# Patient Record
Sex: MALE | Race: WHITE | NOT HISPANIC OR LATINO | Employment: FULL TIME | URBAN - METROPOLITAN AREA
[De-identification: names, ages, dates, MRNs, and addresses within clinical notes are randomized per-mention and may not be internally consistent; named-entity substitution may affect disease eponyms.]

---

## 2023-08-25 ENCOUNTER — TELEPHONE (OUTPATIENT)
Dept: UROLOGY | Facility: CLINIC | Age: 53
End: 2023-08-25
Payer: COMMERCIAL

## 2023-09-05 DIAGNOSIS — R97.20 ELEVATED PSA: Primary | ICD-10-CM

## 2023-09-06 ENCOUNTER — OFFICE VISIT (OUTPATIENT)
Dept: UROLOGY | Facility: CLINIC | Age: 53
End: 2023-09-06
Payer: COMMERCIAL

## 2023-09-06 VITALS — DIASTOLIC BLOOD PRESSURE: 91 MMHG | SYSTOLIC BLOOD PRESSURE: 151 MMHG | HEART RATE: 94 BPM

## 2023-09-06 DIAGNOSIS — R97.20 ELEVATED PSA: Primary | ICD-10-CM

## 2023-09-06 DIAGNOSIS — Z80.42 FAMILY HISTORY OF PROSTATE CANCER IN FATHER: ICD-10-CM

## 2023-09-06 DIAGNOSIS — R39.198 DIFFICULTY VOIDING: ICD-10-CM

## 2023-09-06 LAB
BILIRUBIN, UA POC OHS: NEGATIVE
BLOOD, UA POC OHS: NEGATIVE
CLARITY, UA POC OHS: CLEAR
COLOR, UA POC OHS: YELLOW
GLUCOSE, UA POC OHS: NEGATIVE
KETONES, UA POC OHS: NEGATIVE
LEUKOCYTES, UA POC OHS: NEGATIVE
NITRITE, UA POC OHS: NEGATIVE
PH, UA POC OHS: 5
PROTEIN, UA POC OHS: NEGATIVE
SPECIFIC GRAVITY, UA POC OHS: 1.02
UROBILINOGEN, UA POC OHS: 0.2

## 2023-09-06 PROCEDURE — 1159F PR MEDICATION LIST DOCUMENTED IN MEDICAL RECORD: ICD-10-PCS | Mod: CPTII,S$GLB,, | Performed by: NURSE PRACTITIONER

## 2023-09-06 PROCEDURE — 4010F PR ACE/ARB THEARPY RXD/TAKEN: ICD-10-PCS | Mod: CPTII,S$GLB,, | Performed by: NURSE PRACTITIONER

## 2023-09-06 PROCEDURE — 99204 PR OFFICE/OUTPT VISIT, NEW, LEVL IV, 45-59 MIN: ICD-10-PCS | Mod: S$GLB,,, | Performed by: NURSE PRACTITIONER

## 2023-09-06 PROCEDURE — 3077F PR MOST RECENT SYSTOLIC BLOOD PRESSURE >= 140 MM HG: ICD-10-PCS | Mod: CPTII,S$GLB,, | Performed by: NURSE PRACTITIONER

## 2023-09-06 PROCEDURE — 1159F MED LIST DOCD IN RCRD: CPT | Mod: CPTII,S$GLB,, | Performed by: NURSE PRACTITIONER

## 2023-09-06 PROCEDURE — 3080F DIAST BP >= 90 MM HG: CPT | Mod: CPTII,S$GLB,, | Performed by: NURSE PRACTITIONER

## 2023-09-06 PROCEDURE — 3077F SYST BP >= 140 MM HG: CPT | Mod: CPTII,S$GLB,, | Performed by: NURSE PRACTITIONER

## 2023-09-06 PROCEDURE — 1160F RVW MEDS BY RX/DR IN RCRD: CPT | Mod: CPTII,S$GLB,, | Performed by: NURSE PRACTITIONER

## 2023-09-06 PROCEDURE — 81003 POCT URINALYSIS(INSTRUMENT): ICD-10-PCS | Mod: QW,S$GLB,, | Performed by: NURSE PRACTITIONER

## 2023-09-06 PROCEDURE — 4010F ACE/ARB THERAPY RXD/TAKEN: CPT | Mod: CPTII,S$GLB,, | Performed by: NURSE PRACTITIONER

## 2023-09-06 PROCEDURE — 3080F PR MOST RECENT DIASTOLIC BLOOD PRESSURE >= 90 MM HG: ICD-10-PCS | Mod: CPTII,S$GLB,, | Performed by: NURSE PRACTITIONER

## 2023-09-06 PROCEDURE — 99204 OFFICE O/P NEW MOD 45 MIN: CPT | Mod: S$GLB,,, | Performed by: NURSE PRACTITIONER

## 2023-09-06 PROCEDURE — 1160F PR REVIEW ALL MEDS BY PRESCRIBER/CLIN PHARMACIST DOCUMENTED: ICD-10-PCS | Mod: CPTII,S$GLB,, | Performed by: NURSE PRACTITIONER

## 2023-09-06 PROCEDURE — 81003 URINALYSIS AUTO W/O SCOPE: CPT | Mod: QW,S$GLB,, | Performed by: NURSE PRACTITIONER

## 2023-09-06 RX ORDER — TAMSULOSIN HYDROCHLORIDE 0.4 MG/1
0.4 CAPSULE ORAL DAILY
Qty: 30 CAPSULE | Refills: 11 | Status: SHIPPED | OUTPATIENT
Start: 2023-09-06 | End: 2024-09-05

## 2023-09-06 NOTE — PROGRESS NOTES
Subjective:       Patient ID: Pedro Contreras is a 52 y.o. male.    Chief Complaint: Elevated PSA      HPI: 52-year-old male, new to [facility], referred for an elevated PSA.    Patient had a PSA on 08/08/2023 of 6.64 with a% free of 5.  Patient states 4 weeks prior to that his PSA was 6.66.    Patient's have a family history of prostate cancer.  States his father had prostate cancer.    Patient complains of some difficulty voiding.  Feels like he has to strain to void.  Also states his stream is slow and weak.    Denies any pain or burning urination.  Denies any odor to the urine.  Denies any fever or body aches.  Denies any unexpected weight loss.  No other urinary complaints at this time.       Past Medical History:   Past Medical History:   Diagnosis Date    Essential (primary) hypertension     Gout, unspecified     High cholesterol     Kidney stones     Type 2 diabetes mellitus without complications        Past Surgical Historical:   Past Surgical History:   Procedure Laterality Date    APPENDECTOMY      KIDNEY STONE SURGERY      LITHOTRIPSY      URETEROSCOPY          Medications:   Medication List with Changes/Refills   New Medications    TAMSULOSIN (FLOMAX) 0.4 MG CAP    Take 1 capsule (0.4 mg total) by mouth once daily.        Past Social History:   Social History     Socioeconomic History    Marital status:    Tobacco Use    Smoking status: Never    Smokeless tobacco: Never   Substance and Sexual Activity    Alcohol use: Yes    Drug use: Never       Allergies:   Review of patient's allergies indicates:   Allergen Reactions    Cipro [ciprofloxacin hcl]         Family History: History reviewed. No pertinent family history.     Review of Systems:  Review of Systems   Constitutional:  Negative for activity change and appetite change.   HENT:  Negative for congestion and dental problem.    Eyes:  Negative for visual disturbance.   Respiratory:  Negative for chest tightness and shortness of  breath.    Cardiovascular:  Negative for chest pain.   Gastrointestinal:  Negative for abdominal distention and abdominal pain.   Genitourinary:  Positive for difficulty urinating. Negative for decreased urine volume, dysuria, enuresis, flank pain, frequency, genital sores, hematuria, penile discharge, penile pain, penile swelling, scrotal swelling, testicular pain and urgency.   Musculoskeletal:  Negative for back pain and neck pain.   Skin:  Negative for color change.   Neurological:  Negative for dizziness.   Hematological:  Negative for adenopathy.   Psychiatric/Behavioral:  Negative for agitation, behavioral problems and confusion.        Physical Exam:  Physical Exam  Vitals and nursing note reviewed.   Constitutional:       Appearance: He is well-developed.   HENT:      Head: Normocephalic.   Eyes:      Pupils: Pupils are equal, round, and reactive to light.   Cardiovascular:      Rate and Rhythm: Normal rate and regular rhythm.      Heart sounds: Normal heart sounds.   Pulmonary:      Effort: Pulmonary effort is normal.      Breath sounds: Normal breath sounds.   Abdominal:      General: Bowel sounds are normal.      Palpations: Abdomen is soft.   Genitourinary:     Penis: Normal.       Prostate: Enlarged (Prostate difficult to palpate due to large pannus.  Prostate is enlarged with no nodules and nontender.  Prostate is symmetrical.).      Rectum: Normal.   Musculoskeletal:         General: Normal range of motion.      Cervical back: Normal range of motion and neck supple.   Skin:     General: Skin is warm and dry.   Neurological:      Mental Status: He is alert and oriented to person, place, and time.   Psychiatric:         Behavior: Behavior normal.     Urinalysis:  Normal   Bladder scan:  0 cc    Assessment/Plan:   1. Elevated PSA/family history of prostate cancer:  Patient's PSA is elevated at 6.64.  His% free was 5%  Patient had a colonoscopy yesterday.  Therefore will not proceed with repeat  PSA.  Discussed MRI and biopsy.    At this time patient would like to proceed with MRI.  Did discuss the recommendation of a prostate biopsy regardless of MRI results.    Patient did state understanding.  However, will wait for MRI results before proceeding with biopsy.    2. Difficulty voiding:  Patient's urinalysis is normal.  Bladder scan is 0 cc.  Will go ahead and start the patient on Flomax 0.4 mg daily.    Follow-up to arrange pending MRI.  Problem List Items Addressed This Visit    None  Visit Diagnoses       Elevated PSA    -  Primary    Relevant Orders    POCT Urinalysis(Instrument) (Completed)    MRI Prostate W W/O Contrast    Family history of prostate cancer in father        Relevant Orders    MRI Prostate W W/O Contrast    Difficulty voiding        Relevant Medications    tamsulosin (FLOMAX) 0.4 mg Cap    Other Relevant Orders    POCT Bladder Scan

## 2023-09-13 ENCOUNTER — TELEPHONE (OUTPATIENT)
Dept: UROLOGY | Facility: CLINIC | Age: 53
End: 2023-09-13
Payer: COMMERCIAL

## 2023-09-13 NOTE — TELEPHONE ENCOUNTER
Spoke with pt. Advised that sometimes some insurance take a little longer to approve but will get with other staff member to check into this.

## 2023-09-13 NOTE — TELEPHONE ENCOUNTER
----- Message from Susie Murray sent at 9/13/2023  2:34 PM CDT -----  Contact: Sherri/ Dr. Agustin Polanco needs someone from the office to call the patient at [phone], Regards to no one has called him to schedule the MRI and he needs to know the status of his referral.    Thanks  Td

## 2023-09-14 NOTE — TELEPHONE ENCOUNTER
Spoke with pt to see if he heard anything yet. He reported no. I advised that I did check with management and she was showing pending insurance approval. Pt states he will call his insurance and check into this.

## 2023-10-04 DIAGNOSIS — R97.20 ABNORMAL PROSTATE SPECIFIC ANTIGEN (PSA): Primary | ICD-10-CM

## 2023-10-04 DIAGNOSIS — N42.89 PROSTATE MASS: ICD-10-CM

## 2023-10-10 ENCOUNTER — TELEPHONE (OUTPATIENT)
Dept: UROLOGY | Facility: CLINIC | Age: 53
End: 2023-10-10
Payer: COMMERCIAL

## 2023-10-10 NOTE — TELEPHONE ENCOUNTER
Spoke with pt, he had a few questions therefore I answered them all to the best of my ability. Pt stated understanding. He will come for con/edu and do TRUS w/BX here as planned. I advised he could call anytime with questions or concerns and he could write down questions and we would address them at con/edu appointment if he did not call.

## 2023-10-10 NOTE — TELEPHONE ENCOUNTER
----- Message from Kenia Del Angel MA sent at 10/10/2023 10:01 AM CDT -----    ----- Message -----  From: Arlene Mae  Sent: 10/10/2023   9:09 AM CDT  To: Mayte Christianson Staff    Pt set up trus/bx but has ?'s about the mri again and option of having bx at [facility]

## 2023-10-23 ENCOUNTER — TELEPHONE (OUTPATIENT)
Dept: UROLOGY | Facility: CLINIC | Age: 53
End: 2023-10-23
Payer: COMMERCIAL

## 2023-10-23 NOTE — TELEPHONE ENCOUNTER
Contacted, documents faxed per request. BJP    ----- Message from Alexa Palmer sent at 10/23/2023  8:47 AM CDT -----  Za from [facility] in regards to if medical request sent on 10/12/23. Still waiting on response... please call her back at [phone] or fax [phone].            Thanks  nikolas

## 2023-11-14 RX ORDER — COLCHICINE 0.6 MG/1
1.2 TABLET ORAL
COMMUNITY
Start: 2023-09-13

## 2023-11-14 RX ORDER — PRAVASTATIN SODIUM 20 MG/1
20 TABLET ORAL
COMMUNITY
Start: 2023-08-08

## 2023-11-14 RX ORDER — OFLOXACIN 3 MG/ML
SOLUTION/ DROPS OPHTHALMIC
COMMUNITY
Start: 2023-08-21

## 2023-11-14 RX ORDER — LISINOPRIL 40 MG/1
TABLET ORAL
COMMUNITY
Start: 2023-10-18

## 2023-11-14 RX ORDER — ALLOPURINOL 300 MG/1
TABLET ORAL
COMMUNITY
Start: 2023-10-18

## 2023-11-14 RX ORDER — TIRZEPATIDE 2.5 MG/.5ML
2.5 INJECTION, SOLUTION SUBCUTANEOUS
COMMUNITY
Start: 2023-07-11

## 2023-11-14 RX ORDER — METFORMIN HYDROCHLORIDE 750 MG/1
TABLET, EXTENDED RELEASE ORAL
COMMUNITY
Start: 2023-06-27

## 2023-11-14 RX ORDER — FUROSEMIDE 40 MG/1
TABLET ORAL
COMMUNITY
Start: 2023-10-18

## 2023-11-14 RX ORDER — ALLOPURINOL 100 MG/1
TABLET ORAL
COMMUNITY
Start: 2023-09-20

## 2023-11-15 ENCOUNTER — CLINICAL SUPPORT (OUTPATIENT)
Dept: UROLOGY | Facility: CLINIC | Age: 53
End: 2023-11-15
Payer: COMMERCIAL

## 2023-11-15 DIAGNOSIS — R97.20 ELEVATED PSA: Primary | ICD-10-CM

## 2023-11-15 DIAGNOSIS — N40.2 PROSTATE NODULE: ICD-10-CM

## 2023-11-15 NOTE — PROGRESS NOTES
Consents and education completed for TRUS BX at [facility] on 11/30/23. Pt given highlighted written instructions. Pt states understanding of all verbal and written instructions. Advised to call for any questions or concerns, pt had no questions.    NOTE: Pt has cipro allergy therefore will get with providers for abx treatment prior, during, and after procedure.

## 2023-11-16 RX ORDER — CEFDINIR 300 MG/1
300 CAPSULE ORAL 2 TIMES DAILY
Qty: 8 CAPSULE | Refills: 0 | Status: SHIPPED | OUTPATIENT
Start: 2023-11-16 | End: 2023-11-20

## 2023-11-16 RX ORDER — DIAZEPAM 10 MG/1
10 TABLET ORAL ONCE
Qty: 1 TABLET | Refills: 0 | Status: SHIPPED | OUTPATIENT
Start: 2023-11-16 | End: 2023-11-16

## 2023-11-29 ENCOUNTER — TELEPHONE (OUTPATIENT)
Dept: UROLOGY | Facility: CLINIC | Age: 53
End: 2023-11-29
Payer: COMMERCIAL

## 2023-11-30 ENCOUNTER — PROCEDURE VISIT (OUTPATIENT)
Dept: UROLOGY | Facility: CLINIC | Age: 53
End: 2023-11-30
Payer: COMMERCIAL

## 2023-11-30 VITALS
OXYGEN SATURATION: 97 % | HEART RATE: 82 BPM | HEIGHT: 72 IN | WEIGHT: 311.44 LBS | SYSTOLIC BLOOD PRESSURE: 123 MMHG | DIASTOLIC BLOOD PRESSURE: 64 MMHG | RESPIRATION RATE: 20 BRPM | BODY MASS INDEX: 42.18 KG/M2

## 2023-11-30 DIAGNOSIS — R97.20 ABNORMAL PROSTATE SPECIFIC ANTIGEN (PSA): ICD-10-CM

## 2023-11-30 PROCEDURE — 55700 TRANSRECTAL ULTRASOUND W/ BIOPSY: CPT | Mod: S$GLB,,, | Performed by: UROLOGY

## 2023-11-30 PROCEDURE — 55700 TRANSRECTAL ULTRASOUND W/ BIOPSY: ICD-10-PCS | Mod: S$GLB,,, | Performed by: UROLOGY

## 2023-11-30 PROCEDURE — 76872 US TRANSRECTAL: CPT | Mod: S$GLB,,, | Performed by: UROLOGY

## 2023-11-30 PROCEDURE — 76872 TRANSRECTAL ULTRASOUND W/ BIOPSY: ICD-10-PCS | Mod: S$GLB,,, | Performed by: UROLOGY

## 2023-11-30 NOTE — PATIENT INSTRUCTIONS
NO STRENUOUS ACTIVITY FOR 3 DAYS  NO SEXUAL INTERCOURSE FOR 7 DAYS  YOU MAY NOTICE BLOOD IN URINE OR SEMEN FOR SEVERAL DAYS                What to Expect After a Prostate Biopsy    Please be sure to finish your pre-procedure antibiotics as instructed.    You may have mild bleeding from the rectum or urine for about 1 week to 1 month, or in your ejaculate for several months. This bleeding is normal and expected, and it will stop. You may have mild discomfort in your rectal or urethral area for 24-48 hours.    You cannot do any strenuous lifting, straining, or exercising for 24 hours. You may return to full activity the day after the biopsy.    You may continue to take all your regular medications after the procedure except for the blood thinners.    You may resume all blood-thinning medications once you no longer see any bleeding or whenever your physician prescribing the medication says it is all right to do so. You may take Tylenol if you have a fever and your temperature is less than 100° F or if you have some discomfort.    You will receive a call from the Urology Department at [facility] with the results of your prostate biopsy within one week.    Signs and Symptoms to Report    Call your [facility] urologist at [phone] if you develop any of the following:  Temperature greater than 101°  F  Inability to urinate  A large amount of bleeding from the rectum or in the urine  Persistent or severe pain    After hours or on weekends, you may reach a urology resident on call at this number: [phone].

## 2023-11-30 NOTE — PROCEDURES
"Transrectal Ultrasound w/ Biopsy    Date/Time: 11/30/2023 2:30 PM    Performed by: Sammy Hu MD  Authorized by: Tu Bustos NP    Consent Done?:  Yes (Written)  Time out: Immediately prior to procedure a "time out" was called to verify the correct patient, procedure, equipment, support staff and site/side marked as required.    Indications: Elevated PSA    Position:  Left lateral  Anesthesia:  Pudendal nerve block  Patient sedated: No    Prostate Size:  28g  Left Base Biopsies: 2  Left Mid Biopsies: 2  Left Beaver Biopsies: 2  Right Base Biopsies: 2  Right Mid Biopsies: 2  Right Beaver Biopsies: 2  Total Biopsies:  12    Patient tolerance:  Patient tolerated the procedure well with no immediate complications     Patient was brought to the procedure room placed on the table in left lateral decubitus position lidocaine jelly was instilled into the rectum the ultrasound probe was advanced to level of prostate and a total of 10 cc of lidocaine was injected into the bilateral alfonso prostatic fossa.  A standard 12 core prostate biopsy was obtained patient tolerated the procedure well there were no complications    "

## 2023-12-07 ENCOUNTER — TELEPHONE (OUTPATIENT)
Dept: UROLOGY | Facility: CLINIC | Age: 53
End: 2023-12-07

## 2023-12-07 ENCOUNTER — OFFICE VISIT (OUTPATIENT)
Dept: UROLOGY | Facility: CLINIC | Age: 53
End: 2023-12-07
Payer: COMMERCIAL

## 2023-12-07 VITALS
DIASTOLIC BLOOD PRESSURE: 64 MMHG | HEART RATE: 82 BPM | RESPIRATION RATE: 18 BRPM | SYSTOLIC BLOOD PRESSURE: 120 MMHG | TEMPERATURE: 99 F

## 2023-12-07 DIAGNOSIS — C61 PROSTATE CANCER: Primary | ICD-10-CM

## 2023-12-07 PROCEDURE — 3078F DIAST BP <80 MM HG: CPT | Mod: CPTII,S$GLB,, | Performed by: UROLOGY

## 2023-12-07 PROCEDURE — 3074F SYST BP LT 130 MM HG: CPT | Mod: CPTII,S$GLB,, | Performed by: UROLOGY

## 2023-12-07 PROCEDURE — 1160F PR REVIEW ALL MEDS BY PRESCRIBER/CLIN PHARMACIST DOCUMENTED: ICD-10-PCS | Mod: CPTII,S$GLB,, | Performed by: UROLOGY

## 2023-12-07 PROCEDURE — 1160F RVW MEDS BY RX/DR IN RCRD: CPT | Mod: CPTII,S$GLB,, | Performed by: UROLOGY

## 2023-12-07 PROCEDURE — 1159F MED LIST DOCD IN RCRD: CPT | Mod: CPTII,S$GLB,, | Performed by: UROLOGY

## 2023-12-07 PROCEDURE — 3078F PR MOST RECENT DIASTOLIC BLOOD PRESSURE < 80 MM HG: ICD-10-PCS | Mod: CPTII,S$GLB,, | Performed by: UROLOGY

## 2023-12-07 PROCEDURE — 4010F PR ACE/ARB THEARPY RXD/TAKEN: ICD-10-PCS | Mod: CPTII,S$GLB,, | Performed by: UROLOGY

## 2023-12-07 PROCEDURE — 3074F PR MOST RECENT SYSTOLIC BLOOD PRESSURE < 130 MM HG: ICD-10-PCS | Mod: CPTII,S$GLB,, | Performed by: UROLOGY

## 2023-12-07 PROCEDURE — 4010F ACE/ARB THERAPY RXD/TAKEN: CPT | Mod: CPTII,S$GLB,, | Performed by: UROLOGY

## 2023-12-07 PROCEDURE — 99215 PR OFFICE/OUTPT VISIT, EST, LEVL V, 40-54 MIN: ICD-10-PCS | Mod: S$GLB,,, | Performed by: UROLOGY

## 2023-12-07 PROCEDURE — 99215 OFFICE O/P EST HI 40 MIN: CPT | Mod: S$GLB,,, | Performed by: UROLOGY

## 2023-12-07 PROCEDURE — 1159F PR MEDICATION LIST DOCUMENTED IN MEDICAL RECORD: ICD-10-PCS | Mod: CPTII,S$GLB,, | Performed by: UROLOGY

## 2023-12-07 NOTE — PROGRESS NOTES
Broad-based abutment of the prostatic   capsule in the right posterior base with mild contour bulging without   definitive gross extraprostatic extension, although microscopic extension is   possible.

## 2023-12-07 NOTE — PROGRESS NOTES
Subjective:       Patient ID: Pedro Contreras is a 52 y.o. male.    Chief Complaint: Biopsy results      HPI:  52-year-old male with a PSA of 6.6 status post prostate biopsy unfortunately he was found to have very high-risk prostate cancer [place] 7 8 and 9 in 11 of 12 cores he is here for further discussion and planning    Past Medical History:   Past Medical History:   Diagnosis Date    Essential (primary) hypertension     Gout, unspecified     High cholesterol     Kidney stones     Type 2 diabetes mellitus without complications        Past Surgical Historical:   Past Surgical History:   Procedure Laterality Date    APPENDECTOMY      KIDNEY STONE SURGERY      LITHOTRIPSY      URETEROSCOPY          Medications:   Medication List with Changes/Refills   Current Medications    ALLOPURINOL (ZYLOPRIM) 100 MG TABLET    TAKE 1 TABLET BY MOUTH IN THE EVENING FOR 2 WEEKS THEN TAKE 2 TABLETS BY MOUTH IN THE EVENING THEREAFTER    ALLOPURINOL (ZYLOPRIM) 300 MG TABLET        COLCHICINE, GOUT, (COLCRYS) 0.6 MG TABLET    Take 1.2 mg by mouth. As directed.    DIAZEPAM (VALIUM) 10 MG TAB    Take 1 tablet (10 mg total) by mouth once. for 1 dose    FUROSEMIDE (LASIX) 40 MG TABLET        LISINOPRIL (PRINIVIL,ZESTRIL) 40 MG TABLET        METFORMIN (GLUCOPHAGE-XR) 750 MG ER 24HR TABLET        MOUNJARO 2.5 MG/0.5 ML PNIJ    2.5 mg.    OFLOXACIN (OCUFLOX) 0.3 % OPHTHALMIC SOLUTION        PRAVASTATIN (PRAVACHOL) 20 MG TABLET    20 mg.    TAMSULOSIN (FLOMAX) 0.4 MG CAP    Take 1 capsule (0.4 mg total) by mouth once daily.        Past Social History:   Social History     Socioeconomic History    Marital status:    Tobacco Use    Smoking status: Never    Smokeless tobacco: Never   Substance and Sexual Activity    Alcohol use: Yes    Drug use: Never       Allergies:   Review of patient's allergies indicates:   Allergen Reactions    Cipro [ciprofloxacin hcl]         Family History: History reviewed. No pertinent family history.     Review of  Systems:  Review of Systems    Physical Exam:  Physical Exam    Assessment/Plan:       Problem List Items Addressed This Visit    None  Visit Diagnoses       Prostate cancer    -  Primary    Relevant Orders    CT Abdomen Pelvis W Wo Contrast    NM Bone Scan Whole Body               Very high-risk prostate cancer in a 52-year-old male with a PSA of 6.6:   I had a long discussion with the patient regarding treatment for prostate cancer and all of the available options.  We discussed external beam radiation and all of the risks and benefits, discussed brachytherapy as well as robotic assisted radical prostatectomy.  We had a long discussion on surgery itself including the risks of worsening erectile dysfunction, urinary incontinence, injury to the bowel or anastomotic leak.  All questions were answered the patient reported having a clear understanding of the treatment of prostate cancer and the risk and benefits of each modality.  The appropriate amountof time was spent in face-to-face communication discussing the treatment for prostate cancer.  We will order a bone scan and a CT scan for metastatic evaluation he would like to be transferred a referred to [facility] we will get both of these rolling simultaneously

## 2023-12-07 NOTE — TELEPHONE ENCOUNTER
Spoke with Mari and we have reauthorized CT for [facility].     ----- Message from Alexa Palmer sent at 12/7/2023  2:57 PM CST -----  [facility] is calling in regards to will need a call back at [phone].              Thanks  [facility]

## 2023-12-11 ENCOUNTER — TELEPHONE (OUTPATIENT)
Dept: UROLOGY | Facility: CLINIC | Age: 53
End: 2023-12-11
Payer: COMMERCIAL

## 2023-12-11 ENCOUNTER — PATIENT MESSAGE (OUTPATIENT)
Dept: UROLOGY | Facility: CLINIC | Age: 53
End: 2023-12-11
Payer: COMMERCIAL

## 2023-12-11 NOTE — TELEPHONE ENCOUNTER
I spoke with the pt and he states that he will give me a call after he speak with Dr Hu.      Sara MARLEY

## 2023-12-13 ENCOUNTER — TELEPHONE (OUTPATIENT)
Dept: UROLOGY | Facility: CLINIC | Age: 53
End: 2023-12-13
Payer: COMMERCIAL

## 2023-12-13 NOTE — TELEPHONE ENCOUNTER
Called and spoke to patient, he wanted to see if there was a way we could get a sooner apt for a pet scan, he's having one done on the 29th, and then follows up with [facility] on Michael 10, 2024. He ist rying on his own to get in with [facility] in [place] as well and will take what ever apt that comes first. I gave him the scheduling number to call about the pet scan to ask to be put on a wait list for cancellation apts. He took down the number.     ----- Message from Isabel Tony LPN sent at 12/11/2023  4:56 PM CST -----    ----- Message -----  From: Alexa Palmer  Sent: 12/11/2023   9:23 AM CST  To: Mayte Christianson Staff     Patient is calling in regards to results please call him back at [phone]            Thanks  [facility]

## 2023-12-19 ENCOUNTER — OFFICE VISIT (OUTPATIENT)
Dept: UROLOGY | Facility: CLINIC | Age: 53
End: 2023-12-19
Payer: COMMERCIAL

## 2023-12-19 DIAGNOSIS — C61 PROSTATE CANCER: Primary | ICD-10-CM

## 2023-12-19 PROCEDURE — 4010F ACE/ARB THERAPY RXD/TAKEN: CPT | Mod: CPTII,S$GLB,, | Performed by: UROLOGY

## 2023-12-19 PROCEDURE — 99214 OFFICE O/P EST MOD 30 MIN: CPT | Mod: S$GLB,,, | Performed by: UROLOGY

## 2023-12-19 PROCEDURE — 4010F PR ACE/ARB THEARPY RXD/TAKEN: ICD-10-PCS | Mod: CPTII,S$GLB,, | Performed by: UROLOGY

## 2023-12-19 PROCEDURE — 99214 PR OFFICE/OUTPT VISIT, EST, LEVL IV, 30-39 MIN: ICD-10-PCS | Mod: S$GLB,,, | Performed by: UROLOGY

## 2023-12-19 NOTE — PROGRESS NOTES
Subjective:       Patient ID: Pedro Contreras is a 52 y.o. male.    Chief Complaint: discuss prostate cancer treatment      HPI:  52-year-old male with a PSA of 6.6 status post prostate biopsy. Unfortunately, he was found to have very high-risk prostate cancer [place] 7 8 and 9 in 11 of 12 core. He is here for further discussion about diagnosis. CT was negative for any evidence of metastatic disease. He is scheduled for bone scan on 12/29/2023. He also has an appointment with [facility] on 12/20/2023 for further evaluation.    Past Medical History:   Past Medical History:   Diagnosis Date    Essential (primary) hypertension     Gout, unspecified     High cholesterol     Kidney stones     Type 2 diabetes mellitus without complications        Past Surgical Historical:   Past Surgical History:   Procedure Laterality Date    APPENDECTOMY      KIDNEY STONE SURGERY      LITHOTRIPSY      URETEROSCOPY          Medications:   Medication List with Changes/Refills   Current Medications    ALLOPURINOL (ZYLOPRIM) 100 MG TABLET    TAKE 1 TABLET BY MOUTH IN THE EVENING FOR 2 WEEKS THEN TAKE 2 TABLETS BY MOUTH IN THE EVENING THEREAFTER    ALLOPURINOL (ZYLOPRIM) 300 MG TABLET        COLCHICINE, GOUT, (COLCRYS) 0.6 MG TABLET    Take 1.2 mg by mouth. As directed.    DIAZEPAM (VALIUM) 10 MG TAB    Take 1 tablet (10 mg total) by mouth once. for 1 dose    FUROSEMIDE (LASIX) 40 MG TABLET        LISINOPRIL (PRINIVIL,ZESTRIL) 40 MG TABLET        METFORMIN (GLUCOPHAGE-XR) 750 MG ER 24HR TABLET        MOUNJARO 2.5 MG/0.5 ML PNIJ    2.5 mg.    OFLOXACIN (OCUFLOX) 0.3 % OPHTHALMIC SOLUTION        PRAVASTATIN (PRAVACHOL) 20 MG TABLET    20 mg.    TAMSULOSIN (FLOMAX) 0.4 MG CAP    Take 1 capsule (0.4 mg total) by mouth once daily.        Past Social History:   Social History     Socioeconomic History    Marital status:    Tobacco Use    Smoking status: Never    Smokeless tobacco: Never   Substance and Sexual Activity    Alcohol use: Yes     Drug use: Never       Allergies:   Review of patient's allergies indicates:   Allergen Reactions    Cipro [ciprofloxacin hcl]         Family History: No family history on file.     Review of Systems:  Review of Systems   Constitutional: Negative.    HENT: Negative.     Eyes: Negative.    Respiratory: Negative.     Cardiovascular: Negative.    Gastrointestinal: Negative.    Endocrine: Negative.    Genitourinary: Negative.    Musculoskeletal: Negative.    Skin: Negative.    Allergic/Immunologic: Negative.    Neurological: Negative.    Hematological: Negative.    Psychiatric/Behavioral: Negative.         Physical Exam:  Physical Exam  Constitutional:       Appearance: He is normal weight.   HENT:      Head: Normocephalic.      Nose: Nose normal.      Mouth/Throat:      Mouth: Mucous membranes are moist.      Pharynx: Oropharynx is clear.   Eyes:      Extraocular Movements: Extraocular movements intact.      Conjunctiva/sclera: Conjunctivae normal.      Pupils: Pupils are equal, round, and reactive to light.   Cardiovascular:      Rate and Rhythm: Normal rate and regular rhythm.      Pulses: Normal pulses.      Heart sounds: Normal heart sounds.   Pulmonary:      Effort: Pulmonary effort is normal.      Breath sounds: Normal breath sounds.   Abdominal:      General: Abdomen is flat. Bowel sounds are normal.      Palpations: Abdomen is soft.      Hernia: There is no hernia in the right inguinal area or left inguinal area.   Genitourinary:     Penis: Normal. No phimosis, paraphimosis, hypospadias, erythema, tenderness or discharge.       Testes: Normal.         Right: Mass, tenderness or swelling not present. Right testis is descended. Cremasteric reflex is present.          Left: Mass, tenderness or swelling not present. Left testis is descended. Cremasteric reflex is present.       Prostate: Normal.      Rectum: Normal.   Musculoskeletal:         General: Normal range of motion.      Cervical back: Normal range of  motion and neck supple.   Lymphadenopathy:      Lower Body: No right inguinal adenopathy. No left inguinal adenopathy.   Skin:     General: Skin is warm and dry.      Capillary Refill: Capillary refill takes less than 2 seconds.   Neurological:      General: No focal deficit present.      Mental Status: He is alert and oriented to person, place, and time. Mental status is at baseline.   Psychiatric:         Mood and Affect: Mood normal.         Behavior: Behavior normal.         Thought Content: Thought content normal.         Judgment: Judgment normal.         Assessment/Plan:       Very high-risk prostate cancer in a 52-year-old male with a PSA of 6.6: Further discussion about prostate cancer diagnosis. Encouraged patient to continue with bone scan and follow up with [facility].    Problem List Items Addressed This Visit    None  Visit Diagnoses       Prostate cancer    -  Primary